# Patient Record
Sex: FEMALE | Race: WHITE | NOT HISPANIC OR LATINO | Employment: UNEMPLOYED | URBAN - METROPOLITAN AREA
[De-identification: names, ages, dates, MRNs, and addresses within clinical notes are randomized per-mention and may not be internally consistent; named-entity substitution may affect disease eponyms.]

---

## 2024-11-14 ENCOUNTER — PATIENT MESSAGE (OUTPATIENT)
Dept: NEUROLOGY | Facility: CLINIC | Age: 34
End: 2024-11-14
Payer: COMMERCIAL

## 2024-11-14 ENCOUNTER — TELEPHONE (OUTPATIENT)
Dept: NEUROLOGY | Facility: CLINIC | Age: 34
End: 2024-11-14
Payer: COMMERCIAL

## 2024-11-22 ENCOUNTER — OFFICE VISIT (OUTPATIENT)
Dept: NEUROLOGY | Facility: CLINIC | Age: 34
End: 2024-11-22
Payer: COMMERCIAL

## 2024-11-22 ENCOUNTER — LAB VISIT (OUTPATIENT)
Dept: LAB | Facility: HOSPITAL | Age: 34
End: 2024-11-22
Attending: NURSE PRACTITIONER
Payer: COMMERCIAL

## 2024-11-22 VITALS
BODY MASS INDEX: 19.88 KG/M2 | DIASTOLIC BLOOD PRESSURE: 71 MMHG | WEIGHT: 108 LBS | HEART RATE: 108 BPM | RESPIRATION RATE: 19 BRPM | HEIGHT: 62 IN | SYSTOLIC BLOOD PRESSURE: 103 MMHG

## 2024-11-22 DIAGNOSIS — R20.2 PARESTHESIAS: Primary | ICD-10-CM

## 2024-11-22 DIAGNOSIS — F41.8 DEPRESSION WITH ANXIETY: ICD-10-CM

## 2024-11-22 DIAGNOSIS — R20.2 PARESTHESIAS: ICD-10-CM

## 2024-11-22 DIAGNOSIS — F10.11 H/O ETOH ABUSE: ICD-10-CM

## 2024-11-22 LAB
RHEUMATOID FACT SERPL-ACNC: <13 IU/ML (ref 0–15)
TSH SERPL DL<=0.005 MIU/L-ACNC: 0.74 UIU/ML (ref 0.4–4)

## 2024-11-22 PROCEDURE — 83655 ASSAY OF LEAD: CPT | Performed by: NURSE PRACTITIONER

## 2024-11-22 PROCEDURE — 84443 ASSAY THYROID STIM HORMONE: CPT | Performed by: NURSE PRACTITIONER

## 2024-11-22 PROCEDURE — 84165 PROTEIN E-PHORESIS SERUM: CPT | Mod: 26,,, | Performed by: PATHOLOGY

## 2024-11-22 PROCEDURE — 86334 IMMUNOFIX E-PHORESIS SERUM: CPT | Performed by: NURSE PRACTITIONER

## 2024-11-22 PROCEDURE — 84207 ASSAY OF VITAMIN B-6: CPT | Performed by: NURSE PRACTITIONER

## 2024-11-22 PROCEDURE — 99999 PR PBB SHADOW E&M-EST. PATIENT-LVL V: CPT | Mod: PBBFAC,,, | Performed by: NURSE PRACTITIONER

## 2024-11-22 PROCEDURE — 80321 ALCOHOLS BIOMARKERS 1OR 2: CPT | Performed by: NURSE PRACTITIONER

## 2024-11-22 PROCEDURE — 84425 ASSAY OF VITAMIN B-1: CPT | Performed by: NURSE PRACTITIONER

## 2024-11-22 PROCEDURE — 84446 ASSAY OF VITAMIN E: CPT | Performed by: NURSE PRACTITIONER

## 2024-11-22 PROCEDURE — 83090 ASSAY OF HOMOCYSTEINE: CPT | Performed by: NURSE PRACTITIONER

## 2024-11-22 PROCEDURE — 86431 RHEUMATOID FACTOR QUANT: CPT | Performed by: NURSE PRACTITIONER

## 2024-11-22 PROCEDURE — 36415 COLL VENOUS BLD VENIPUNCTURE: CPT | Mod: PO | Performed by: NURSE PRACTITIONER

## 2024-11-22 PROCEDURE — 84630 ASSAY OF ZINC: CPT | Performed by: NURSE PRACTITIONER

## 2024-11-22 PROCEDURE — 86038 ANTINUCLEAR ANTIBODIES: CPT | Performed by: NURSE PRACTITIONER

## 2024-11-22 PROCEDURE — 84165 PROTEIN E-PHORESIS SERUM: CPT | Performed by: NURSE PRACTITIONER

## 2024-11-22 PROCEDURE — 86235 NUCLEAR ANTIGEN ANTIBODY: CPT | Mod: 59 | Performed by: NURSE PRACTITIONER

## 2024-11-22 PROCEDURE — 86235 NUCLEAR ANTIGEN ANTIBODY: CPT | Performed by: NURSE PRACTITIONER

## 2024-11-22 PROCEDURE — 86334 IMMUNOFIX E-PHORESIS SERUM: CPT | Mod: 26,,, | Performed by: PATHOLOGY

## 2024-11-22 PROCEDURE — 86039 ANTINUCLEAR ANTIBODIES (ANA): CPT | Performed by: NURSE PRACTITIONER

## 2024-11-22 RX ORDER — PREGABALIN 150 MG/1
150 CAPSULE ORAL DAILY
COMMUNITY
End: 2024-11-22

## 2024-11-22 RX ORDER — CANDESARTAN 32 MG/1
32 TABLET ORAL
COMMUNITY
Start: 2024-11-04

## 2024-11-22 RX ORDER — LANOLIN ALCOHOL/MO/W.PET/CERES
100 CREAM (GRAM) TOPICAL
COMMUNITY
Start: 2024-11-04

## 2024-11-22 RX ORDER — MULTIVITAMIN
1 TABLET ORAL DAILY
COMMUNITY
Start: 2024-11-04

## 2024-11-22 RX ORDER — PREGABALIN 100 MG/1
100 CAPSULE ORAL 2 TIMES DAILY
Qty: 180 CAPSULE | Refills: 0 | Status: SHIPPED | OUTPATIENT
Start: 2024-11-22 | End: 2025-05-23

## 2024-11-22 RX ORDER — TRAZODONE HYDROCHLORIDE 100 MG/1
100 TABLET ORAL NIGHTLY
COMMUNITY
Start: 2024-11-04

## 2024-11-22 RX ORDER — ARIPIPRAZOLE 2 MG/1
2 TABLET ORAL DAILY
COMMUNITY

## 2024-11-22 RX ORDER — AMLODIPINE BESYLATE 10 MG/1
10 TABLET ORAL
COMMUNITY
Start: 2024-11-04

## 2024-11-22 RX ORDER — TRAMADOL HYDROCHLORIDE 50 MG/1
50 TABLET ORAL 2 TIMES DAILY PRN
COMMUNITY
Start: 2024-11-14

## 2024-11-22 RX ORDER — LEVETIRACETAM 250 MG/1
250 TABLET ORAL 2 TIMES DAILY
COMMUNITY

## 2024-11-22 RX ORDER — FOLIC ACID 1 MG/1
1 TABLET ORAL DAILY
COMMUNITY
Start: 2024-11-04

## 2024-11-22 RX ORDER — PANTOPRAZOLE SODIUM 40 MG/1
40 TABLET, DELAYED RELEASE ORAL DAILY
COMMUNITY

## 2024-11-22 NOTE — PROGRESS NOTES
"NEUROLOGY  Outpatient Consultation Visit     [facility]  [address], [place] [address]  [phone] (office) / [phone] (fax)    Patient Name:  Radha Martines  :  1990  MR #:  [id number]  Acct #:  [id number]    Date of Neurology Consult: 2024  Name of Provider: MARIA EUGENIA Tate    Other Physicians:  No, Primary Doctor (Primary Care Physician); Self, Aaareferral (Referring)      Chief Complaint: Numbness      History of Present Illness (HPI):  Radha Martines is a right handed  34 y.o. female with a PMHX of anxiety, hyponatremia, HTN, ETOH abuse, depression.       Patient presents today for paresthesias. She recenlty presented to the ED complaining of diffuse pain to upper and lower extremitites. Pain was described as burning. Neuropathy was suspected and she was placed on Gabapentin 100 mg daily for bilateral leg pain. This did not offer any aid. She was taken off this and prescribed Lyrica. This offered more relief.   Patient states she was nearly immobilized from the pain.  She experiences numbness from knee down to toes. Pain is more so at night. She describes the pain as shock-like pain. The pain does keep her up at night on occasion. She also reports numbness to all fingers.   All symptoms started mid September.   She reports drinking heavily everyday; vodka 1 pint daily. She was drinking heavily over the last several months but was drinking moderately over the last several years.   She is no longer drinking ETOH.   While inpatient she was prescribed Tramadol and Trazodone.           [facility] Records 10/2024  "34-year-old female who presented to the emergency department today for evaluation of diffuse pain in upper and lower extremities.  Past medical history significant for alcohol abuse, depression with anxiety,.  Seizure disorder and hypertension.  She reports pain has been present for greater than 1 week but worsening over the last 3 days.  She " "describes this pain as a burning sensation.  She says she has been diagnosed with neuropathy recently started on gabapentin.  She says she feels like gabapentin has worsened symptoms.  She now also reports decreased sensation to bilateral lower extremities as well as pain. She reports pain is so severe that she has been having difficulty ambulating and has had frequent falls.  She does report increased stress and anxiety lately also.  She is anxious at the time my exam.  While in the emergency department she also endorses some mild abdominal pain and nausea.  Workup in the emergency department revealed urinary tract infection.  She will be admitted to hospital medicine service for further evaluation and treatment.   Patient is complaining of pain in both feet and leg more pain than weakness and according to her this has intensified in last 1 week although she used to have pain in both feet long time ago and was treated with gabapentin.  Recently she was changed to Lyrica which is helping to some degree.  She denies any lower back pain and denies involvement of the hands and no radicular symptoms reported."                Past Medical, Surgical, Family & Social History:   Past Medical History:   Diagnosis Date    Abnormal Pap smear     Anxiety     Hyperthyroidism      History reviewed. No pertinent surgical history.  Family History   Problem Relation Name Age of Onset    Diabetes Paternal Grandfather      Diabetes Father      Heart attack Father      Diabetes type II Father      Hyperthyroidism Mother      Anxiety disorder Mother      Anxiety disorder Maternal Grandmother       Alcohol use:  reports current alcohol use.   (Of note, 0.6 oz = 1 beer or 6 oz = 10 beers).  Tobacco use:  reports that she has quit smoking. She has never used smokeless tobacco.  Street drug use:  reports no history of drug use.  Allergies: Patient has no known allergies..    Home Medications:     Current Outpatient Medications:     " "amLODIPine (NORVASC) 10 MG tablet, Take 10 mg by mouth., Disp: , Rfl:     candesartan (ATACAND) 32 MG tablet, Take 32 mg by mouth., Disp: , Rfl:     folic acid (FOLVITE) 1 MG tablet, Take 1 mg by mouth once daily., Disp: , Rfl:     multivitamin (THERAGRAN) per tablet, Take 1 tablet by mouth once daily., Disp: , Rfl:     thiamine 100 MG tablet, Take 100 mg by mouth., Disp: , Rfl:     traMADoL (ULTRAM) 50 mg tablet, Take 50 mg by mouth 2 (two) times daily as needed., Disp: , Rfl:     traZODone (DESYREL) 100 MG tablet, Take 100 mg by mouth every evening., Disp: , Rfl:     alprazolam (XANAX) 0.25 MG tablet, Take 1 tablet (0.25 mg total) by mouth 2 (two) times daily as needed for Anxiety., Disp: 30 tablet, Rfl: 0    ARIPiprazole (ABILIFY) 2 MG Tab, Take 2 mg by mouth once daily., Disp: , Rfl:     levETIRAcetam (KEPPRA) 250 MG Tab, Take 250 mg by mouth 2 (two) times daily., Disp: , Rfl:     ondansetron (ZOFRAN-ODT) 4 MG TbDL, Take 2 tablets (8 mg total) by mouth every 6 (six) hours as needed., Disp: 12 tablet, Rfl: 1    pantoprazole (PROTONIX) 40 MG tablet, Take 40 mg by mouth once daily., Disp: , Rfl:     PNV66-iron fumarate-FA-DSS-dha (PRENEXA) 26-1.2- mg Cap, Take 1 tablet by mouth once daily., Disp: 100 capsule, Rfl: 2    pregabalin (LYRICA) 100 MG capsule, Take 1 capsule (100 mg total) by mouth 2 (two) times daily., Disp: 180 capsule, Rfl: 0  No current facility-administered medications for this visit.    Physical Examination:  /71 (BP Location: Left arm, Patient Position: Sitting)   Pulse 108   Resp 19   Ht 5' 2" (1.575 m)   Wt 49 kg (108 lb 0.4 oz)   BMI 19.76 kg/m²     GENERAL:  General appearance: Well, non-toxic appearing.  No apparent distress. Lip sores; dental caries  Neck: supple.    MENTAL STATUS:  Alertness, attention span & concentration: normal.  Language: normal.  Orientation to self, place & time:  normal.  Memory, recent & remote: normal.  Fund of knowledge: " normal.      SPEECH:  Clear and fluent.  Follows complex commands.      CRANIAL NERVES:  Cranial Nerves II-XII were examined.  II - Visual fields: normal.  III, IV, VI: PERRL, EOMI, No ptosis, No nystagmus.  V - Facial sensation: normal.  VII - Face symmetry & mobility: normal.  VIII - Hearing: normal  IX, X - Palate: mobile & midline.  XI - Shoulder shrug: normal.  XII - Tongue protrusion: normal.        GROSS MOTOR:  Gait & station: able to rise from chair with arms crossed over chest; mild steppage gait  Tone: normal.  Abnormal movements: none.  Finger-nose: normal.  Rapid alternating movements: normal.  Pronator drift: normal      MUSCLE STRENGTH:   Hand grasp:   - right:5/5   - left:5/5    RIGHT    LEFT   5 Deltoids 5   5 Biceps 5   5 Triceps 5   5 Forearm.Pr. 5        5 Iliopsoas flex    5   5 Hip Abduct 5   5 Hip Adduct 5   5 Quads 5   5 Hams 5   5 Dorsiflex 5   5 Plantar Flex 5   5 Ankle Jesus 5   5 Ankle Invert 5         REFLEXES:    RIGHT Reflex   LEFT   2 Biceps 2   2 Brachiorad. 2        1 Patellar 1         SENSORY:  Light touch: Normal throughout.  Sharp touch: hypersensitive to finger tips and bilateral lower feet  Vibration: decreased to bilateral ankles  Temperature: Normal throughout.  Joint Position: Normal throughout.  Proprioception:abnormal       Diagnostic Data Reviewed:   I have personally reviewed provider notes, labs and imaging made available to me today.     Imaging:  MRI L-spine 10/2024 ([facility])  FINDINGS: Examination is limited due to motion. Alignment is normal.  Bone marrow is normal in signal intensity without focal lesion.  The conus is normal in signal intensity. The conus terminates at the L2 level. Vertebral body and intervertebral disc   heights are maintained.  There is no evidence of abnormal enhancement.     At the L1-2 level, there is no evidence of central canal stenosis or neural foraminal narrowing.     At the L2-3 level, there is no evidence of central canal stenosis or  "neural foraminal narrowing.     At the L3-4 level, there is no evidence of central canal stenosis or neural foraminal narrowing.     At the L4-5 level, there is no evidence of central canal stenosis or neural foraminal narrowing.     At the L5-S1 level, there is no evidence of central canal stenosis or neural foraminal narrowing.     IMPRESSION:   No significant abnormality.     CTH 5/2024 (NO)  FINDINGS: Mild prominence of the ventricles and sulci, more advanced than expected for age. There is no evidence of acute intracranial hemorrhage or large territorial infarct.   There is no evidence of mass, mass effect, or midline shift. The visualized orbits are normal in appearance. Mild mucosal thickening in the maxillary sinuses bilaterally. Osseous structures are unremarkable.   Impression    No acute intracranial abnormality.      Labs:  Lab Results   Component Value Date    WBC 6.54 01/09/2014    HGB 10.5 (L) 01/11/2024    HCT 30 (L) 01/11/2024     01/09/2014    MCV 88 01/09/2014    RDW 12.5 01/09/2014     Lab Results   Component Value Date     10/25/2024    K 3.5 (L) 10/25/2024     11/13/2012    CO2 21 (L) 10/25/2024    BUN 12 10/25/2024    CREATININE 0.65 10/25/2024    GLU 99 11/13/2012    CALCIUM 10 10/25/2024     Lab Results   Component Value Date    PROT 8 (H) 10/25/2024    ALBUMIN 3.7 10/25/2024    BILITOT 0.4 10/25/2024    AST 38 (H) 10/25/2024    ALKPHOS 109 10/25/2024    ALT 26 10/25/2024     No results found for: "INR", "PROTIME", "PTT"  Lab Results   Component Value Date    CHOL 193 11/13/2012    HDL 69 11/13/2012    LDLCALC 109.0 11/13/2012    TRIG 73 11/13/2012    CHOLHDL 35.8 11/13/2012     Lab Results   Component Value Date    HGBA1C 4.6 05/25/2024      No results found for: "EUXPDFRA15"  No results found for: "FOLATE"  Lab Results   Component Value Date    TSH 0.694 01/09/2014     No results found for: "VITAMINB1"  Lab Results   Component Value Date    RPR Non-reactive 01/09/2014 " "    No results found for: "MARYLOU"  No components found for: "HEPATITISCANTIBODY"  No components found for: "HIV 1/2 AG/AB"  No results found for: "CRP"  No components found for: "SEDIMENTATIONRATE"    EEG 2/2024 ([facility]):  EEG Description:     1>Background:     -Occipital Rhythm: present    Frequency: 7 Hz   Voltage: medium     Organization:  fleetingly seen  Reactivity to eye opening and   closure: NA   -Other background: medium voltage generalized polymorphic slow   activity noted.     2>Drowsiness: abnormal     3>Sleep: abnormal     4>Abnormalities:   Medium voltage generalized polymorphic slow activity   Slow posterior dominant rhythm     EEG Diagnosis: This EEG is abnormal because of:   Generalized slow activity   Slow PDR       Clinical Interpretation: This abnormal EEG is consistent with   moderate generalized non-specific cerebral dysfunction. There   were no seizures or interictal epileptiform discharges seen         Assessment and Plan:  Radha Martines is a 34 y.o. female.    Problem List Items Addressed This Visit          Neuro    Paresthesias - Primary    Current Assessment & Plan     Reports of new onset paresthesias that began in September and worsened in October, warranting a ED visit.    - diffuse pain to all fingers and bilateral legs from knees down to toes  Pt was started on low dose Gabapentin which did not offer aid and was not maximized.   Lyrica started while inpatient and did offer some aid.   Neuro exam with intact strength; hyperintense pin prick to all fingers and bilateral feet; poor proprioception; gait appears mildly steppage  Suspect ETOH-induced neuropathy as pt reports drinking very eavily months prior (1 pint vodka/day)  MRI L-spine unrevealing   Serologies reviewed   - obtain remaining serologies for completeness  Obtain EMG/NCS  Increase Lyrica to 100 mg BID  discussed complete abstinence from ETOH  Encouraged pt to f/u with her PCP for further follow up. Limit opiates            " Psychiatric    H/O ETOH abuse    Current Assessment & Plan     Was drinking 1 pit vodka daily for several months but suspect longer  Obtain PETH level          Depression with anxiety    Current Assessment & Plan     Following psych at [facility]                  Important to note, also  has a past medical history of Abnormal Pap smear, Anxiety, and Hyperthyroidism.            The patient will return to clinic in 2-3 weeks for med check         All questions were answered and patient is comfortable with the plan.       Thank you very much for the opportunity to assist in this patient's care.    If you have any questions or concerns, please do not hesitate to contact me at any time.    Sincerely,     MARIA EUGENIA Tate  [facility] [place]         I spent a total of 49 minutes on the day of the visit.This includes face to face time and non-face to face time preparing to see the patient (eg, review of tests), Obtaining and/or reviewing separately obtained history, Documenting clinical information in the electronic or other health record, Independently interpreting resultsand communicating results to the patient/family/caregiver, or Care coordination.

## 2024-11-22 NOTE — ASSESSMENT & PLAN NOTE
Reports of new onset paresthesias that began in September and worsened in October, warranting a ED visit.    - diffuse pain to all fingers and bilateral legs from knees down to toes  Pt was started on low dose Gabapentin which did not offer aid and was not maximized.   Lyrica started while inpatient and did offer some aid.   Neuro exam with intact strength; hyperintense pin prick to all fingers and bilateral feet; poor proprioception; gait appears mildly steppage  Suspect ETOH-induced neuropathy as pt reports drinking very eavily months prior (1 pint vodka/day)  MRI L-spine unrevealing   Serologies reviewed   - obtain remaining serologies for completeness  Obtain EMG/NCS  Increase Lyrica to 100 mg BID  discussed complete abstinence from ETOH  Encouraged pt to f/u with her PCP for further follow up. Limit opiates

## 2024-11-23 LAB — HCYS SERPL-SCNC: 8.2 UMOL/L (ref 4–15.5)

## 2024-11-25 LAB
ALBUMIN SERPL ELPH-MCNC: 4.45 G/DL (ref 3.35–5.55)
ALPHA1 GLOB SERPL ELPH-MCNC: 0.3 G/DL (ref 0.17–0.41)
ALPHA2 GLOB SERPL ELPH-MCNC: 0.75 G/DL (ref 0.43–0.99)
ANA PATTERN 1: NORMAL
ANA SER QL IF: POSITIVE
ANA TITR SER IF: NORMAL {TITER}
ANTI-SSA ANTIBODY: 0.13 RATIO (ref 0–0.99)
ANTI-SSA INTERPRETATION: NEGATIVE
ANTI-SSB ANTIBODY: 0.12 RATIO (ref 0–0.99)
ANTI-SSB INTERPRETATION: NEGATIVE
ARSENIC BLD-MCNC: <1 NG/ML
B-GLOBULIN SERPL ELPH-MCNC: 0.88 G/DL (ref 0.5–1.1)
CADMIUM BLD-MCNC: 0.3 NG/ML
CITY: NORMAL
COUNTY: NORMAL
GAMMA GLOB SERPL ELPH-MCNC: 1.43 G/DL (ref 0.67–1.58)
GUARDIAN FIRST NAME: NORMAL
GUARDIAN LAST NAME: NORMAL
HOME PHONE: NORMAL
INTERPRETATION SERPL IFE-IMP: NORMAL
LEAD BLD-MCNC: <1 MCG/DL
MERCURY BLD-MCNC: <1 NG/ML
PROT SERPL-MCNC: 7.8 G/DL (ref 6–8.4)
RACE: NORMAL
STATE: NORMAL
STREET ADDRESS: NORMAL
VENOUS/CAPILLARY: NORMAL
ZINC SERPL-MCNC: 72 UG/DL (ref 60–130)
ZIP: NORMAL

## 2024-11-26 LAB
CLINICAL BIOCHEMIST REVIEW: NORMAL
PATHOLOGIST INTERPRETATION IFE: NORMAL
PATHOLOGIST INTERPRETATION SPE: NORMAL
PLPETH BLD-MCNC: NORMAL NG/ML
POPETH BLD-MCNC: <10 NG/ML
PYRIDOXAL SERPL-MCNC: 4 UG/L (ref 5–50)
VIT B1 BLD-MCNC: 95 UG/L (ref 38–122)

## 2024-11-27 LAB
A-TOCOPHEROL VIT E SERPL-MCNC: 1132 UG/DL (ref 500–1800)
ANTI SM ANTIBODY: 0.11 RATIO (ref 0–0.99)
ANTI SM/RNP ANTIBODY: 0.14 RATIO (ref 0–0.99)
ANTI-SM INTERPRETATION: NEGATIVE
ANTI-SM/RNP INTERPRETATION: NEGATIVE
ANTI-SSA ANTIBODY: 0.13 RATIO (ref 0–0.99)
ANTI-SSA INTERPRETATION: NEGATIVE
ANTI-SSB ANTIBODY: 0.12 RATIO (ref 0–0.99)
ANTI-SSB INTERPRETATION: NEGATIVE
DSDNA AB SER-ACNC: NORMAL [IU]/ML

## 2024-12-12 ENCOUNTER — OFFICE VISIT (OUTPATIENT)
Dept: NEUROLOGY | Facility: CLINIC | Age: 34
End: 2024-12-12
Payer: COMMERCIAL

## 2024-12-12 DIAGNOSIS — R20.2 PARESTHESIAS: ICD-10-CM

## 2024-12-12 PROCEDURE — 3044F HG A1C LEVEL LT 7.0%: CPT | Mod: CPTII,95,, | Performed by: NURSE PRACTITIONER

## 2024-12-12 PROCEDURE — 4010F ACE/ARB THERAPY RXD/TAKEN: CPT | Mod: CPTII,95,, | Performed by: NURSE PRACTITIONER

## 2024-12-12 PROCEDURE — 1159F MED LIST DOCD IN RCRD: CPT | Mod: CPTII,95,, | Performed by: NURSE PRACTITIONER

## 2024-12-12 PROCEDURE — 99214 OFFICE O/P EST MOD 30 MIN: CPT | Mod: 95,,, | Performed by: NURSE PRACTITIONER

## 2024-12-12 PROCEDURE — 1160F RVW MEDS BY RX/DR IN RCRD: CPT | Mod: CPTII,95,, | Performed by: NURSE PRACTITIONER

## 2024-12-12 NOTE — PROGRESS NOTES
"NEUROLOGY  Outpatient Consultation Visit     [facility]  [address], [place] [address]  [phone] (office) / [phone] (fax)    Patient Name:  Radha Martines  :  1990  MR #:  [id number]  Acct #:  [id number]    Date of Neurology Consult: 2024  Name of Provider: MARIA EUGENIA Tate    Other Physicians:  No, Primary Doctor (Primary Care Physician); No ref. provider found (Referring)      Chief Complaint: Follow-up      History of Present Illness (HPI):  Radha Martines is a right handed  34 y.o. female with a PMHX of anxiety, hyponatremia, HTN, ETOH abuse, depression.       Patient presents today for paresthesias. She recenlty presented to the ED complaining of diffuse pain to upper and lower extremitites. Pain was described as burning. Neuropathy was suspected and she was placed on Gabapentin 100 mg daily for bilateral leg pain. This did not offer any aid. She was taken off this and prescribed Lyrica. This offered more relief.   Patient states she was nearly immobilized from the pain.  She experiences numbness from knee down to toes. Pain is more so at night. She describes the pain as shock-like pain. The pain does keep her up at night on occasion. She also reports numbness to all fingers.   All symptoms started mid September.   She reports drinking heavily everyday; vodka 1 pint daily. She was drinking heavily over the last several months but was drinking moderately over the last several years.   She is no longer drinking ETOH.   While inpatient she was prescribed Tramadol and Trazodone.           [place] Records 10/2024  "34-year-old female who presented to the emergency department today for evaluation of diffuse pain in upper and lower extremities.  Past medical history significant for alcohol abuse, depression with anxiety,.  Seizure disorder and hypertension.  She reports pain has been present for greater than 1 week but worsening over the last 3 days.  She " "describes this pain as a burning sensation.  She says she has been diagnosed with neuropathy recently started on gabapentin.  She says she feels like gabapentin has worsened symptoms.  She now also reports decreased sensation to bilateral lower extremities as well as pain. She reports pain is so severe that she has been having difficulty ambulating and has had frequent falls.  She does report increased stress and anxiety lately also.  She is anxious at the time my exam.  While in the emergency department she also endorses some mild abdominal pain and nausea.  Workup in the emergency department revealed urinary tract infection.  She will be admitted to hospital medicine service for further evaluation and treatment.   Patient is complaining of pain in both feet and leg more pain than weakness and according to her this has intensified in last 1 week although she used to have pain in both feet long time ago and was treated with gabapentin.  Recently she was changed to Lyrica which is helping to some degree.  She denies any lower back pain and denies involvement of the hands and no radicular symptoms reported."      Interval Hx 12/12/2024:  Patient presents virtually for medication check. At last visit, lyrica was increased to 100 mg BID. This has offered aid. She states the cold weather does exacerbate her symptoms. She continues to report abstinence from ETOH. EMG/NCS schedule for next month.           Past Medical, Surgical, Family & Social History:   Past Medical History:   Diagnosis Date    Abnormal Pap smear     Anxiety     Hyperthyroidism      History reviewed. No pertinent surgical history.  Family History   Problem Relation Name Age of Onset    Diabetes Paternal Grandfather      Diabetes Father      Heart attack Father      Diabetes type II Father      Hyperthyroidism Mother      Anxiety disorder Mother      Anxiety disorder Maternal Grandmother       Alcohol use:  reports current alcohol use.   (Of note, 0.6 oz " = 1 beer or 6 oz = 10 beers).  Tobacco use:  reports that she has quit smoking. She has never used smokeless tobacco.  Street drug use:  reports no history of drug use.  Allergies: Patient has no known allergies..    Home Medications:     Current Outpatient Medications:     alprazolam (XANAX) 0.25 MG tablet, Take 1 tablet (0.25 mg total) by mouth 2 (two) times daily as needed for Anxiety., Disp: 30 tablet, Rfl: 0    amLODIPine (NORVASC) 10 MG tablet, Take 10 mg by mouth., Disp: , Rfl:     ARIPiprazole (ABILIFY) 2 MG Tab, Take 2 mg by mouth once daily., Disp: , Rfl:     candesartan (ATACAND) 32 MG tablet, Take 32 mg by mouth., Disp: , Rfl:     folic acid (FOLVITE) 1 MG tablet, Take 1 mg by mouth once daily., Disp: , Rfl:     levETIRAcetam (KEPPRA) 250 MG Tab, Take 250 mg by mouth 2 (two) times daily., Disp: , Rfl:     multivitamin (THERAGRAN) per tablet, Take 1 tablet by mouth once daily., Disp: , Rfl:     ondansetron (ZOFRAN-ODT) 4 MG TbDL, Take 2 tablets (8 mg total) by mouth every 6 (six) hours as needed., Disp: 12 tablet, Rfl: 1    pantoprazole (PROTONIX) 40 MG tablet, Take 40 mg by mouth once daily., Disp: , Rfl:     PNV66-iron fumarate-FA-DSS-dha (PRENEXA) 26-1.2- mg Cap, Take 1 tablet by mouth once daily., Disp: 100 capsule, Rfl: 2    pregabalin (LYRICA) 100 MG capsule, Take 1 capsule (100 mg total) by mouth 2 (two) times daily., Disp: 180 capsule, Rfl: 0    thiamine 100 MG tablet, Take 100 mg by mouth., Disp: , Rfl:     traMADoL (ULTRAM) 50 mg tablet, Take 50 mg by mouth 2 (two) times daily as needed., Disp: , Rfl:     traZODone (DESYREL) 100 MG tablet, Take 100 mg by mouth every evening., Disp: , Rfl:     Physical Examination: limited due to being virtual   There were no vitals taken for this visit.    GENERAL:  General appearance: Well, non-toxic appearing.  No apparent distress. Lip sores; dental caries  Neck: supple.    MENTAL STATUS:  Alertness, attention span & concentration: normal.  Language:  normal.  Orientation to self, place & time:  normal.  Memory, recent & remote: normal.  Fund of knowledge: normal.      SPEECH:  Clear and fluent.  Follows complex commands.          PRIOR FACE TO FACE EXAM   CRANIAL NERVES:  Cranial Nerves II-XII were examined.  II - Visual fields: normal.  III, IV, VI: PERRL, EOMI, No ptosis, No nystagmus.  V - Facial sensation: normal.  VII - Face symmetry & mobility: normal.  VIII - Hearing: normal  IX, X - Palate: mobile & midline.  XI - Shoulder shrug: normal.  XII - Tongue protrusion: normal.        GROSS MOTOR:  Gait & station: able to rise from chair with arms crossed over chest; mild steppage gait  Tone: normal.  Abnormal movements: none.  Finger-nose: normal.  Rapid alternating movements: normal.  Pronator drift: normal      MUSCLE STRENGTH:   Hand grasp:   - right:5/5   - left:5/5    RIGHT    LEFT   5 Deltoids 5   5 Biceps 5   5 Triceps 5   5 Forearm.Pr. 5        5 Iliopsoas flex    5   5 Hip Abduct 5   5 Hip Adduct 5   5 Quads 5   5 Hams 5   5 Dorsiflex 5   5 Plantar Flex 5   5 Ankle Jesus 5   5 Ankle Invert 5         REFLEXES:    RIGHT Reflex   LEFT   2 Biceps 2   2 Brachiorad. 2        1 Patellar 1         SENSORY:  Light touch: Normal throughout.  Sharp touch: hypersensitive to finger tips and bilateral lower feet  Vibration: decreased to bilateral ankles  Temperature: Normal throughout.  Joint Position: Normal throughout.  Proprioception:abnormal       Diagnostic Data Reviewed:   I have personally reviewed provider notes, labs and imaging made available to me today.     Imaging:  MRI L-spine 10/2024 ([facility])  FINDINGS: Examination is limited due to motion. Alignment is normal.  Bone marrow is normal in signal intensity without focal lesion.  The conus is normal in signal intensity. The conus terminates at the L2 level. Vertebral body and intervertebral disc   heights are maintained.  There is no evidence of abnormal enhancement.     At the L1-2 level, there is no  "evidence of central canal stenosis or neural foraminal narrowing.     At the L2-3 level, there is no evidence of central canal stenosis or neural foraminal narrowing.     At the L3-4 level, there is no evidence of central canal stenosis or neural foraminal narrowing.     At the L4-5 level, there is no evidence of central canal stenosis or neural foraminal narrowing.     At the L5-S1 level, there is no evidence of central canal stenosis or neural foraminal narrowing.     IMPRESSION:   No significant abnormality.     CTH 5/2024 ([facility])  FINDINGS: Mild prominence of the ventricles and sulci, more advanced than expected for age. There is no evidence of acute intracranial hemorrhage or large territorial infarct.   There is no evidence of mass, mass effect, or midline shift. The visualized orbits are normal in appearance. Mild mucosal thickening in the maxillary sinuses bilaterally. Osseous structures are unremarkable.   Impression    No acute intracranial abnormality.      Labs:  Lab Results   Component Value Date    WBC 6.54 01/09/2014    HGB 10.5 (L) 01/11/2024    HCT 30 (L) 01/11/2024     01/09/2014    MCV 88 01/09/2014    RDW 12.5 01/09/2014     Lab Results   Component Value Date     10/25/2024    K 3.5 (L) 10/25/2024     11/13/2012    CO2 21 (L) 10/25/2024    BUN 12 10/25/2024    CREATININE 0.65 10/25/2024    GLU 99 11/13/2012    CALCIUM 10 10/25/2024     Lab Results   Component Value Date    PROT 8 (H) 10/25/2024    ALBUMIN 3.7 10/25/2024    BILITOT 0.4 10/25/2024    AST 38 (H) 10/25/2024    ALKPHOS 109 10/25/2024    ALT 26 10/25/2024     No results found for: "INR", "PROTIME", "PTT"  Lab Results   Component Value Date    CHOL 193 11/13/2012    HDL 69 11/13/2012    LDLCALC 109.0 11/13/2012    TRIG 73 11/13/2012    CHOLHDL 35.8 11/13/2012     Lab Results   Component Value Date    HGBA1C 4.6 05/25/2024      No results found for: "ZYNPMJTF72"  No results found for: "FOLATE"  Lab Results   Component " "Value Date    TSH 0.739 11/22/2024     No results found for: "VITAMINB1"  Lab Results   Component Value Date    RPR Non-reactive 01/09/2014     No results found for: "MARYLOU"  No components found for: "HEPATITISCANTIBODY"  No components found for: "HIV 1/2 AG/AB"  No results found for: "CRP"  No components found for: "SEDIMENTATIONRATE"    EEG 2/2024 ([facility]):  EEG Description:     1>Background:     -Occipital Rhythm: present    Frequency: 7 Hz   Voltage: medium     Organization:  fleetingly seen  Reactivity to eye opening and   closure: NA   -Other background: medium voltage generalized polymorphic slow   activity noted.     2>Drowsiness: abnormal     3>Sleep: abnormal     4>Abnormalities:   Medium voltage generalized polymorphic slow activity   Slow posterior dominant rhythm     EEG Diagnosis: This EEG is abnormal because of:   Generalized slow activity   Slow PDR       Clinical Interpretation: This abnormal EEG is consistent with   moderate generalized non-specific cerebral dysfunction. There   were no seizures or interictal epileptiform discharges seen         Assessment and Plan:  Radha Martines is a 34 y.o. female.  Problem List Items Addressed This Visit          Neuro    Paresthesias    Current Assessment & Plan     Reports of new onset paresthesias that began in September and worsened in October, warranting a ED visit.    - diffuse pain to all fingers and bilateral legs from knees down to toes  Pt was started on low dose Gabapentin which did not offer aid and was not maximized.   Lyrica started while inpatient and did offer some aid.   Prev neuro exam with intact strength; hyperintense pin prick to all fingers and bilateral feet; poor proprioception; gait appears mildly steppage  Suspect ETOH-induced neuropathy as pt reports drinking very eavily months prior (1 pint vodka/day)  MRI L-spine unrevealing   Serologies reviewed    - mildly low vitamin B6 level; rec supplementation   - PETH normal   Awaiting " EMG/NCS  Continue Lyrica 100 mg BID  discussed complete abstinence from ETOH  Encouraged pt to f/u with her PCP for further follow up. Limit opiates                      Important to note, also  has a past medical history of Abnormal Pap smear, Anxiety, and Hyperthyroidism.            The patient will return to clinic after EMG/NCS        All questions were answered and patient is comfortable with the plan.       Thank you very much for the opportunity to assist in this patient's care.    If you have any questions or concerns, please do not hesitate to contact me at any time.    Sincerely,     MARIA EUGENIA Tate  [facility] [place]       The patient location is: [place], [place]  The chief complaint leading to consultation is: med check     Visit type: audiovisual    Face to Face time with patient: 10  20 minutes of total time spent on the encounter, which includes face to face time and non-face to face time preparing to see the patient (eg, review of tests), Obtaining and/or reviewing separately obtained history, Documenting clinical information in the electronic or other health record, Independently interpreting results (not separately reported) and communicating results to the patient/family/caregiver, or Care coordination (not separately reported).         Each patient to whom he or she provides medical services by telemedicine is:  (1) informed of the relationship between the physician and patient and the respective role of any other health care provider with respect to management of the patient; and (2) notified that he or she may decline to receive medical services by telemedicine and may withdraw from such care at any time.    Notes:

## 2024-12-12 NOTE — ASSESSMENT & PLAN NOTE
Reports of new onset paresthesias that began in September and worsened in October, warranting a ED visit.    - diffuse pain to all fingers and bilateral legs from knees down to toes  Pt was started on low dose Gabapentin which did not offer aid and was not maximized.   Lyrica started while inpatient and did offer some aid.   Prev neuro exam with intact strength; hyperintense pin prick to all fingers and bilateral feet; poor proprioception; gait appears mildly steppage  Suspect ETOH-induced neuropathy as pt reports drinking very eavily months prior (1 pint vodka/day)  MRI L-spine unrevealing   Serologies reviewed    - mildly low vitamin B6 level; rec supplementation   - PETH normal   Awaiting EMG/NCS  Continue Lyrica 100 mg BID  discussed complete abstinence from ETOH  Encouraged pt to f/u with her PCP for further follow up. Limit opiates

## 2025-01-09 ENCOUNTER — TELEPHONE (OUTPATIENT)
Dept: NEUROLOGY | Facility: CLINIC | Age: 35
End: 2025-01-09

## 2025-01-09 ENCOUNTER — PROCEDURE VISIT (OUTPATIENT)
Dept: NEUROLOGY | Facility: CLINIC | Age: 35
End: 2025-01-09
Payer: COMMERCIAL

## 2025-01-09 DIAGNOSIS — R20.2 PARESTHESIAS: ICD-10-CM

## 2025-01-09 PROCEDURE — 95886 MUSC TEST DONE W/N TEST COMP: CPT | Mod: S$GLB,,, | Performed by: PSYCHIATRY & NEUROLOGY

## 2025-01-09 PROCEDURE — 95911 NRV CNDJ TEST 9-10 STUDIES: CPT | Mod: S$GLB,,, | Performed by: PSYCHIATRY & NEUROLOGY

## 2025-01-09 NOTE — PROCEDURES
[facility]  [address]  [place] [address]  [phone]      Full Name: Radha Martines Gender: Female  Patient ID: [id number] YOB: 1990      Visit Date: 1/9/2025 8:55 AM  Age: 34 Years  Examining Physician: Aleisha Lopez MD   Referring Physician: MALINDA Sparks   Technologist: MOON Fermin   Height: 5 feet 2 inch  History: Patient complaining of numbness and tingling of bilateral lower extremities, including feet.  Also, numbness in bilateral hands.  No neck pain.  Low back pain, across the back.  She has a large scar in the right wrist due to an injury.        Sensory NCS      Nerve / Sites Rec. Site Onset Lat Peak Lat NP Amp Segments Distance Velocity Temp.     ms ms µV  cm m/s °C   R Median - Digit II (Antidromic)      Wrist Dig II NR NR NR Wrist - Dig II 13 NR 35.9      Ref.   <=3.40 >=20.0 Ref.  >=50    R Ulnar - Digit V (Antidromic)      Wrist Dig V 2.90 3.40 3.2 Wrist - Dig V 11 38 35.9      Ref.   <=3.10 >=15.0 Ref.  >=50    L Sural - Ankle (Calf)      Calf Ankle NR NR NR Calf - Ankle 14 NR 35.9      Ref.   <=4.50 >=5.0 Ref.  >=40    L Superficial peroneal - Ankle      Lat leg Ankle NR NR NR Lat leg - Ankle 12 NR 35.9      Ref.   <=4.50 >=5.0 Ref.          Motor NCS      Nerve / Sites Muscle Latency Ref. Amplitude Ref. Amp % Duration Segments Distance Lat Diff Ref. Velocity Ref. Temp.     ms ms mV mV % ms  cm ms ms m/s m/s °C   R Median - APB      Wrist APB 4.75 <=3.90 5.6 >=6.0 100 10.02 Wrist - APB      35.9      Elbow APB 9.94  3.7  66.2 11.40 Elbow - Wrist 20.5 5.19  40 >=50 35.9   R Ulnar - ADM      Wrist ADM 2.85 <=3.10 5.8 >=7.0 100 4.81 Wrist - ADM      35.9      B.Elbow ADM 6.52  5.7  97.9 4.83 B.Elbow - Wrist 19 3.67  52 >=50 35.9      A.Elbow ADM 8.44  5.3  92.1 4.73 A.Elbow - B.Elbow 10 1.92  52  35.9   L Peroneal - EDB      Ankle EDB 5.65 <=5.50 1.5 >=3.0 100 11.92 Ankle - EDB      35.9      Fib head EDB  12.54  1.3  84.2 14.92 Fib head - Ankle 29 6.90  42 >=40 35.9      Pop fossa EDB 15.04  0.7  48.9 15.63 Pop fossa - Fib head 10 2.50  40  35.9   L Peroneal - Tib Ant      Fib Head Tib Ant 2.96 <=4.00 5.3 >=4.0 100 14.06 Fib Head - Tib Ant      35.9      Pop fossa Tib Ant 5.44  4.2  78.9 12.48 Pop fossa - Fib Head 10 2.48  40 >=40 35.9   L Tibial - AH      Ankle AH 3.83 <=6.00 3.5 >=6.0 100 6.44 Ankle - AH      35.9      Pop fossa AH 12.77  3.3  95.2  Pop fossa - Ankle 34.5 8.94  39 >=40 35.9   R Median, Ulnar - Lumbrical-Interossei      Median Wrist Lumb II 4.25  1.2  100 5.08 Median Wrist - Lumb II 10     35.9      Ulnar Wrist Lumb II 3.08  5.2  444 5.40 Ulnar Wrist - Lumb II 10     35.9           Median Wrist - Ulnar Wrist  1.17 <=0.50   35.9       F  Wave      Nerve Fmin Ref.    ms ms   L Peroneal - EDB 59.22 <=56.00   L Tibial - AH 54.32 <=56.00   R Median - APB 36.61 <=31.00   R Ulnar - ADM 28.28 <=32.00       EMG Summary Table     Spontaneous Recruitment Activation Duration Amplitude Polyphasia Comment   Muscle Ins Act Fib Fasc Pattern - - - - -   R. Deltoid Normal 0 0 Normal Normal Normal Normal Normal Normal   R. Triceps brachii Normal 0 0 Normal Normal Normal Normal Normal Normal   R. Pronator teres Normal 0 0 Normal Normal Normal Normal Normal Normal   R. First dorsal interosseous Normal 0 0 Normal Normal Normal Normal Normal Normal   R. Opponens pollicis Normal 0 0 Mod Dec Normal +2 +2 Normal Normal   L. Tibialis anterior Normal 0 0 Normal Normal Normal Normal Normal Normal   L. Gastrocnemius (Medial head) Normal 0 0 Normal Normal Normal Normal Normal Normal   L. Vastus medialis Normal 0 0 Normal Normal Normal Normal Normal Normal   L. Biceps femoris (short head) Normal 0 0 Normal Normal Normal Normal Normal Normal   L. Abductor hallucis Normal 0 0 Sl Dec Normal +1 +1 Normal Normal         Summary: Right upper extremity nerve conduction studies show absent median sensory response. The ulnar sensory  response shows prolonged latency, low amplitude, and slowed conduction velocity. Median motor response shows prolonged latency, low amplitude, and slowed conduction velocity. The ulnar motor shows low amplitude response. There is relative and absolute slowing of the median motor latency as compared to the ulnar. Median F wave is prolonged.     Right upper extremity needle examination shows neurogenic changes in the distal median-innervated muscle. No fibrillation potentials were seen.     Left lower extremity nerve conduction studies show absent sural and superficial peroneal sensory responses. Peroneal motor response over the extensor digitorum brevis shows prolonged latency and low amplitude response but is normal over the tibialis anterior. Tibial motor study shows low amplitude with mildly slow conduction velocity. Peroneal F wave is prolonged.     Left lower extremity needle examination shows neurogenic changes in the abductor hallucis. No fibrillation potentials were seen.     Impression: Abnormal EMG. 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     There is no EMG evidence of a right cervical radiculopathy or left lumbosacral radiculopathy nor a myopathy.     Thank you for referring to the [facility] in [place]. Please feel free to contact the clinic if you have any further questions regarding this study or report.    _____________________________  Aleisha Lopez MD

## 2025-01-14 ENCOUNTER — OFFICE VISIT (OUTPATIENT)
Dept: NEUROLOGY | Facility: CLINIC | Age: 35
End: 2025-01-14
Payer: COMMERCIAL

## 2025-01-14 DIAGNOSIS — R20.2 PARESTHESIAS: Primary | ICD-10-CM

## 2025-01-14 PROCEDURE — 1160F RVW MEDS BY RX/DR IN RCRD: CPT | Mod: CPTII,95,, | Performed by: NURSE PRACTITIONER

## 2025-01-14 PROCEDURE — 1159F MED LIST DOCD IN RCRD: CPT | Mod: CPTII,95,, | Performed by: NURSE PRACTITIONER

## 2025-01-14 PROCEDURE — 98005 SYNCH AUDIO-VIDEO EST LOW 20: CPT | Mod: 95,,, | Performed by: NURSE PRACTITIONER

## 2025-01-14 NOTE — ASSESSMENT & PLAN NOTE
Reports of new onset paresthesias that began in September and worsened in October, warranting a ED visit.    - diffuse pain to all fingers and bilateral legs from knees down to toes  Pt was started on low dose Gabapentin which did not offer aid but was not maximized.   Lyrica started while inpatient and did offer some aid.   Prev neuro exam with intact strength; hyperintense pin prick to all fingers and bilateral feet; poor proprioception; gait appears mildly steppage  EMG/NCS noted with mod-severe length-dependent axonal sensorimotor neuropathy and superimposed right median mononeuropathy    - Suspect ETOH-induced as pt reports drinking very eavily months prior (1 pint vodka/day)  MRI L-spine unrevealing   Serologies reviewed    - mildly low vitamin B6 level; rec supplementation   - PETH normal   Lyrica 100 mg BID is offering great symptomatic aid; She reports ambulating much better   - continue  OK to wear wrist splint nightly for pain relief  discussed complete abstinence from ETOH  Encouraged pt to f/u with her PCP for further follow up. Limit opiates

## 2025-04-11 DIAGNOSIS — R20.2 PARESTHESIAS: ICD-10-CM

## 2025-04-11 RX ORDER — PREGABALIN 100 MG/1
100 CAPSULE ORAL 2 TIMES DAILY
Qty: 180 CAPSULE | Refills: 1 | Status: SHIPPED | OUTPATIENT
Start: 2025-04-11